# Patient Record
Sex: FEMALE | Race: BLACK OR AFRICAN AMERICAN | NOT HISPANIC OR LATINO | Employment: UNEMPLOYED | URBAN - METROPOLITAN AREA
[De-identification: names, ages, dates, MRNs, and addresses within clinical notes are randomized per-mention and may not be internally consistent; named-entity substitution may affect disease eponyms.]

---

## 2022-04-01 ENCOUNTER — OFFICE VISIT (OUTPATIENT)
Dept: FAMILY MEDICINE | Facility: CLINIC | Age: 32
End: 2022-04-01

## 2022-04-01 VITALS
RESPIRATION RATE: 16 BRPM | OXYGEN SATURATION: 98 % | BODY MASS INDEX: 26.53 KG/M2 | HEART RATE: 110 BPM | WEIGHT: 159.25 LBS | TEMPERATURE: 97.1 F | DIASTOLIC BLOOD PRESSURE: 76 MMHG | SYSTOLIC BLOOD PRESSURE: 106 MMHG | HEIGHT: 65 IN

## 2022-04-01 DIAGNOSIS — Z02.89 HISTORY AND PHYSICAL EXAMINATION, IMMIGRATION: Primary | ICD-10-CM

## 2022-04-01 PROCEDURE — 99385 PREV VISIT NEW AGE 18-39: CPT | Performed by: FAMILY MEDICINE

## 2022-04-01 RX ORDER — VITAMIN A ACETATE, .BETA.-CAROTENE, ASCORBIC ACID, CHOLECALCIFEROL, .ALPHA.-TOCOPHEROL ACETATE, DL-, THIAMINE MONONITRATE, RIBOFLAVIN, NIACINAMIDE, PYRIDOXINE HYDROCHLORIDE, FOLIC ACID, CYANOCOBALAMIN, CALCIUM CARBONATE, FERROUS FUMARATE, ZINC OXIDE, AND CUPRIC OXIDE 2000; 2000; 120; 400; 22; 1.84; 3; 20; 10; 1; 12; 200; 27; 25; 2 [IU]/1; [IU]/1; MG/1; [IU]/1; MG/1; MG/1; MG/1; MG/1; MG/1; MG/1; UG/1; MG/1; MG/1; MG/1; MG/1
1 TABLET ORAL DAILY
COMMUNITY

## 2022-04-01 RX ORDER — CHLORAL HYDRATE 500 MG
1 CAPSULE ORAL
COMMUNITY

## 2022-04-01 ASSESSMENT — PAIN SCALES - GENERAL: PAINLEVEL: NO PAIN (0)

## 2022-04-01 NOTE — PROGRESS NOTES
Lorelei is here for INS physical exam.  She moved from [place] in 2016.  She lives in [place], [place] and is not working. Stated that overall she is doing well and has no concern today.  Denied of headache or dizziness.  No URI symptoms and denied of CP or SOB.  No fever or chill. No N/V/D/C.  No history of any of STD and denied of its risk.  No abnormal vaginal discharge or having rash in the pelvic area.  Denied of depression and has no history of depression, anxiety or mood disorder. No history or current homicidal or suicidal ideation.  No hallucination and has no history of psychiatric hospitalization.  No pain; no problem with sleeping.  Eating and drinking ok.  No problem with urination.  Denied of coughing or nigth sweat. No unintentional weight loss.  No exposure to TB or history of TB.  Has not had a positive PPD. Denied of drug use.      Problem list and histories reviewed & adjusted, as indicated.  Additional history: as documented      PAST MEDICAL HISTORY:   Past Medical History:   Diagnosis Date     No known health problems        PAST SURGICAL HISTORY:   Past Surgical History:   Procedure Laterality Date     Ectopic surgery         FAMILY HISTORY:   Family History   Problem Relation Age of Onset     Hypertension Mother      No Known Problems Father      Hypertension Maternal Grandmother      Alzheimer Disease Maternal Grandfather      No Known Problems Paternal Grandmother      Diabetes Paternal Grandfather      No Known Problems Sister      No Known Problems Son      No Known Problems Daughter      No Known Problems Sister        SOCIAL HISTORY:   Social History     Tobacco Use     Smoking status: Never Smoker     Smokeless tobacco: Never Used   Substance Use Topics     Alcohol use: Yes        No Known Allergies    Current Outpatient Medications   Medication Sig Dispense Refill     fish oil-omega-3 fatty acids 1000 MG capsule Take 1 capsule by mouth       Prenatal Vit-Fe Fumarate-FA (PNV PRENATAL PLUS  "MULTIVITAMIN) 27-1 MG TABS per tablet Take 1 tablet by mouth daily       vitamin D3 (CHOLECALCIFEROL) 125 MCG (5000 UT) tablet Take 5,000 Units by mouth every 48 hours           ROS:  Constitutional, HEENT, cardiovascular, pulmonary, gi and gu systems are negative, except as otherwise noted.      OBJECTIVE:                                                      .Vitals: /76 (BP Location: Right arm, Patient Position: Sitting, Cuff Size: Adult Regular)   Pulse 110   Temp 97.1  F (36.2  C) (Temporal)   Resp 16   Ht 1.651 m (5' 5\")   Wt 72.2 kg (159 lb 4 oz)   LMP 03/22/2022 (Approximate)   SpO2 98%   Breastfeeding Yes   BMI 26.50 kg/m    BMI= Body mass index is 26.5 kg/m .   GENERAL: healthy, alert and no distress  EYES: Eyes grossly normal to inspection, PERRL and conjunctivae and sclerae normal  HENT: ear canals and TM's normal, nose and mouth without ulcers or lesions.  Nares are non-congested. Oropharynx is pink and moist. No tender with palpation to the sinuses.  NECK: no adenopathy, no palpable masses, or scars and thyroid normal to palpation  RESP: lungs clear to auscultation - no rales, rhonchi or wheezes  CV: regular rate and rhythm, no murmur, no peripheral edema and peripheral pulses strong  ABDOMEN: soft, nontender, no hepatosplenomegaly or palpable masses and bowel sounds normal  MS: no gross musculoskeletal defects noted, no edema. Walk with no limping, normal gait. All 4 extremities are equally in strength. Ankle, knees, hips, shoulders, elbows and wrists exams normal. Normal fine motor skills on fingers. Back is straight, no lordosis or scoliosis. No tender with palpation.  SKIN: no suspicious lesions or rashes  NEURO: Normal strength and tone, mentation intact and speech normal  PSYCH: mentation appears normal, affect normal/bright. No hallucination, suicidal or homicidal ideation    Diagnostic Test Results:  No results found for any visits on 04/01/22.        ASSESSMENT/PLAN:              "                                         1. Health examination of defined subpopulation  I personal reviewed the report of Medical Examination and Vaccination Record from the [employer]. She has no chronic medical condition. Has not had a positive PPD test and she displayed no symptoms of active TB.  No exposure to TB that she knows of.  Checked the QuantiFERON level today. Denied any risk for STI, but will check for gonorrhea.  Recent syphilis screening test was negative.  She has no history of depression, anxiety, mood disorder and has never been diagnosed or hospitalized for any psychiatric disorders.  She displayed no physical or mental disorders with associated harmful behavior. She denied of using drug. Reviewed her immunization record, labs as ordered.  Will fill out her paper on her behalf once the results are available. Instructed not to open the sealed envelope.  All of her questions were answered and encouraged to call in if has any concern.    Also informed her that she should follow up with her primary provider for her routine and chronic medical care. I did not address any chronic medical problem today.  She understands.      ICD-10-CM    1. History and physical examination, immigration  Z02.89 Quantiferon-TB Gold Plus     Hepatitis B Surface Antibody     Varicella Zoster Virus Antibody IgG     Hepatitis Antibody A IgG     Neisseria gonorrhoeae PCR     [place] IMMIGRATION PHYSICAL         F/U as needed.    Hollis Angeles Mai, MD  [facility]

## 2022-04-05 ENCOUNTER — TELEPHONE (OUTPATIENT)
Dept: FAMILY MEDICINE | Facility: CLINIC | Age: 32
End: 2022-04-05
Payer: COMMERCIAL

## 2022-04-05 NOTE — TELEPHONE ENCOUNTER
[facility] lab calling in, wanting to know if you want a chlamydia test ordered with this patient and added on to the urine sample she left.   Phone:  [phone]          If you do, order needs to be faxed to lab @ [facility] [phone]

## 2022-04-11 ENCOUNTER — TELEPHONE (OUTPATIENT)
Dept: FAMILY MEDICINE | Facility: CLINIC | Age: 32
End: 2022-04-11
Payer: COMMERCIAL

## 2022-04-11 NOTE — TELEPHONE ENCOUNTER
Patient called inquiring if received lab results from [facility]. No results were received. Fax number given to patient to have [facility] send to Dr. Egan.    Myron Cueto RN

## 2022-04-13 ENCOUNTER — TELEPHONE (OUTPATIENT)
Dept: FAMILY MEDICINE | Facility: CLINIC | Age: 32
End: 2022-04-13
Payer: COMMERCIAL

## 2022-04-13 NOTE — TELEPHONE ENCOUNTER
Pt called inquiring when to expect her forms to be ready for pickup from her immigration physical. She was told it would be 1 week at the physical and that there is an urgency for these.

## 2022-04-14 ENCOUNTER — ALLIED HEALTH/NURSE VISIT (OUTPATIENT)
Dept: FAMILY MEDICINE | Facility: CLINIC | Age: 32
End: 2022-04-14
Payer: COMMERCIAL

## 2022-04-14 DIAGNOSIS — Z23 ENCOUNTER FOR IMMUNIZATION: Primary | ICD-10-CM

## 2022-04-14 PROCEDURE — 99207 PR NO CHARGE NURSE ONLY: CPT

## 2022-04-14 PROCEDURE — 90716 VAR VACCINE LIVE SUBQ: CPT

## 2022-04-14 PROCEDURE — 90472 IMMUNIZATION ADMIN EACH ADD: CPT

## 2022-04-14 PROCEDURE — 90746 HEPB VACCINE 3 DOSE ADULT IM: CPT

## 2022-04-14 PROCEDURE — 90471 IMMUNIZATION ADMIN: CPT

## 2022-04-14 NOTE — TELEPHONE ENCOUNTER
Please let patient know that I reviewed the lab results, she is not immunized to hepatitis B and chickenpox.  She will need to get the first dose of the series for her immigration physical.  She can get it through her doctor or our clinic.  When I get the documentation then  I will filled out immigration physical for her.  Thank you.  Hollis

## 2022-04-14 NOTE — TELEPHONE ENCOUNTER
Called patient and updated her on Dr. Egan's instructions to get the chicken pox vaccine and to start the Hepatitis B series. Patient verbalized understanding.    Myron Cueto RN